# Patient Record
Sex: FEMALE | Race: BLACK OR AFRICAN AMERICAN | NOT HISPANIC OR LATINO | Employment: FULL TIME | URBAN - METROPOLITAN AREA
[De-identification: names, ages, dates, MRNs, and addresses within clinical notes are randomized per-mention and may not be internally consistent; named-entity substitution may affect disease eponyms.]

---

## 2020-05-07 RX ORDER — ESTERIFIED ESTROGEN AND METHYLTESTOSTERONE 1.25; 2.5 MG/1; MG/1
1 TABLET ORAL DAILY
COMMUNITY
End: 2020-05-19

## 2020-05-07 RX ORDER — LEVOTHYROXINE SODIUM 88 UG/1
88 TABLET ORAL
COMMUNITY
End: 2020-06-02 | Stop reason: SDUPTHER

## 2020-05-07 RX ORDER — IRON,CARBONYL/ASCORBIC ACID 100-250 MG
TABLET ORAL
COMMUNITY

## 2020-05-07 RX ORDER — LABETALOL 200 MG/1
200 TABLET, FILM COATED ORAL 2 TIMES DAILY
COMMUNITY
End: 2021-05-28

## 2020-05-07 RX ORDER — PROGESTERONE 200 MG/1
200 CAPSULE ORAL DAILY
COMMUNITY
End: 2021-05-28

## 2020-05-19 ENCOUNTER — OFFICE VISIT (OUTPATIENT)
Dept: OBSTETRICS AND GYNECOLOGY | Facility: CLINIC | Age: 50
End: 2020-05-19
Payer: COMMERCIAL

## 2020-05-19 ENCOUNTER — TELEPHONE (OUTPATIENT)
Dept: OBSTETRICS AND GYNECOLOGY | Facility: CLINIC | Age: 50
End: 2020-05-19

## 2020-05-19 VITALS
DIASTOLIC BLOOD PRESSURE: 80 MMHG | BODY MASS INDEX: 38.89 KG/M2 | WEIGHT: 242 LBS | SYSTOLIC BLOOD PRESSURE: 122 MMHG | HEIGHT: 66 IN

## 2020-05-19 DIAGNOSIS — Z01.419 GYNECOLOGIC EXAM NORMAL: Primary | ICD-10-CM

## 2020-05-19 DIAGNOSIS — Z12.31 VISIT FOR SCREENING MAMMOGRAM: ICD-10-CM

## 2020-05-19 PROCEDURE — 99396 PR PREVENTIVE VISIT,EST,40-64: ICD-10-PCS | Mod: S$GLB,,, | Performed by: NURSE PRACTITIONER

## 2020-05-19 PROCEDURE — 99396 PREV VISIT EST AGE 40-64: CPT | Mod: S$GLB,,, | Performed by: NURSE PRACTITIONER

## 2020-05-19 RX ORDER — ZOLPIDEM TARTRATE 10 MG/1
TABLET ORAL
COMMUNITY
Start: 2020-04-21

## 2020-05-19 RX ORDER — CLONIDINE HYDROCHLORIDE 0.1 MG/1
TABLET ORAL
COMMUNITY
Start: 2020-05-11 | End: 2021-05-28

## 2020-05-19 RX ORDER — ESTRADIOL 1 MG/G
1 GEL TOPICAL DAILY
Qty: 30 G | Refills: 11 | Status: SHIPPED | OUTPATIENT
Start: 2020-05-19 | End: 2021-01-14 | Stop reason: SDUPTHER

## 2020-05-19 RX ORDER — ASPIRIN 325 MG
50000 TABLET, DELAYED RELEASE (ENTERIC COATED) ORAL
COMMUNITY
Start: 2020-04-28 | End: 2021-05-28

## 2020-05-19 NOTE — PROGRESS NOTES
Subjective:       Patient ID: Trell Gee is a 49 y.o. female.    Chief Complaint:  Well Woman (would like to discuss change in HRT)      History of Present Illness  HPI  Annual Exam-Postmenopausal  Patient presents for annual exam. The patient has no complaints today. The patient is sexually active. GYN screening history: last pap: was abnormal: LSIL. The patient is taking hormone replacement therapy. Patient denies post-menopausal vaginal bleeding. The patient wears seatbelts: yes. The patient participates in regular exercise: yes. Has the patient ever been transfused or tattooed?: no. The patient reports that there is not domestic violence in her life.    Does not feel like HRT is working, no libido, no energy, hot flashes    GYN & OB History  No LMP recorded (exact date). Patient is postmenopausal.   Date of Last Pap: 2020    OB History    Para Term  AB Living   2 2           SAB TAB Ectopic Multiple Live Births                  # Outcome Date GA Lbr Rafiq/2nd Weight Sex Delivery Anes PTL Lv   2 Para            1 Para                Review of Systems  Review of Systems   Constitutional: Negative for activity change, appetite change, chills, fatigue and fever.   HENT: Negative for nasal congestion and tinnitus.    Eyes: Negative for visual disturbance.   Respiratory: Negative for cough and shortness of breath.    Cardiovascular: Negative for chest pain and palpitations.   Gastrointestinal: Negative for abdominal pain, bloating, blood in stool, constipation, nausea and vomiting.   Endocrine: Negative for diabetes, hair loss and hot flashes.   Genitourinary: Positive for decreased libido and hot flashes. Negative for bladder incontinence, dysmenorrhea, dyspareunia, dysuria, flank pain, frequency, genital sores, hematuria, menorrhagia, menstrual problem, pelvic pain, urgency, vaginal bleeding, vaginal discharge, vaginal pain, urinary incontinence, postcoital bleeding, postmenopausal  bleeding, vaginal dryness and vaginal odor.   Musculoskeletal: Negative for arthralgias, back pain, leg pain and myalgias.   Integumentary:  Negative for rash, acne, hair changes, mole/lesion, breast mass, nipple discharge, breast skin changes and breast tenderness.   Neurological: Negative for vertigo, syncope, numbness and headaches.   Hematological: Does not bruise/bleed easily.   Psychiatric/Behavioral: Negative for depression and sleep disturbance. The patient is not nervous/anxious.    Breast: Negative for asymmetry, lump, mass, mastodynia, nipple discharge, skin changes and tenderness          Objective:    Physical Exam:   Constitutional: Vital signs are normal. She appears well-developed and well-nourished.    HENT:   Head: Normocephalic.   Nose: No epistaxis.    Eyes: Lids are normal.    Neck: Trachea normal and full passive range of motion without pain.    Cardiovascular: Normal rate, regular rhythm and normal heart sounds.     Pulmonary/Chest: Effort normal and breath sounds normal. Right breast exhibits no mass, no skin change, no tenderness and no swelling. Left breast exhibits no mass, no skin change, no tenderness and no swelling. Breasts are symmetrical.        Abdominal: Normal appearance.     Genitourinary: Rectum normal, vagina normal and uterus normal. Pelvic exam was performed with patient supine. Cervix is normal. Right adnexum displays no mass and no tenderness. Left adnexum displays no mass and no tenderness. No erythema in the vagina. No vaginal discharge found. Labial bartholins normal.             Lymphadenopathy:     She has no cervical adenopathy.      Psychiatric: She has a normal mood and affect. Her speech is normal and behavior is normal. Judgment and thought content normal. Cognition and memory are normal.          Assessment:        1. Visit for screening mammogram     Annual well woman exam        Plan:      We discussed the pellets as an option for HRT, right now we will try the  test cream at [facility] and divigel, stop the covaryx. cologard ordered

## 2020-05-19 NOTE — PATIENT INSTRUCTIONS
Breast Health: Breast Self-Awareness  What is breast self-awareness?  Breast self-awareness is knowing how your breasts normally look and feel. Your breasts change as you go through different stages of your life. So its important to learn what is normal for your breasts. Breast self-awareness helps you notice any changes in your breasts right away. Report any changes to your healthcare provider.  Why is breast self-awareness important?  Many experts now say that women should focus on breast self-awareness instead of doing a breast self-examination (BSE). These experts include the American Cancer Society, the U.S. Preventive Services Task Force, and the American Congress of Obstetricians and Gynecologists. Some experts even advise not teaching women to do a BSE. Thats because research hasnt shown a clear benefit to doing BSEs.  Breast self-awareness is different than a BSE. Breast self-awareness isnt about following a certain method and schedule. Its about knowing what's normal for your breasts. That way you can notice even small changes right away. If you see any changes, report them to your healthcare provider.  Changes to look for  Call your healthcare provider if you find any changes in your breasts that concern you. These changes may include:  · A lump  · Nipple discharge other than breast milk, especially a bloody discharge  · Swelling  · A change in size or shape  · Skin irritation, such as redness, thickening, or dimpling of the skin  · Swollen lymph nodes in the armpit  · Nipple problems, such as pain or redness  If you find a lump  Contact your provider if you find lumpiness in one breast, feel something different in the tissue, or feel a definite lump. Sometimes lumpiness may be due to menstrual changes. But there may be reason for concern.  Your provider may want to see you right away if you have:  · Nipple discharge that is bloody  · Skin changes on your breast, such as dimpling or puckering  Its  normal to be upset if you find a lump. But its important to contact your provider right away. Remember that most breast lumps are benign. This means they are not cancer.  Date Last Reviewed: 8/10/2015  © 3962-6411 [employer]. [address] [place] [address]. All rights reserved. This information is not intended as a substitute for professional medical care. Always follow your healthcare professional's instructions.        The Range of Pap Test Results  When your Pap test is sent to the lab, the lab studies your cell samples and reports any abnormal cell changes. Your health care provider can discuss these changes with you. In some cases, an abnormal Pap test is due to an infection. More serious cell changes range from dysplasia to cancer. Talk to your health care provider about your Pap test.    Normal results  Cervical cells, even normal ones, are always changing. As they mature, normal squamous cells move from deeper layers within the cervix. Over time, these cells flatten and cover the surface of the cervix. Within the cervical canal, the cells are different. These glandular cells are taller and not as flat as the cells on the surface of the cervix. When a Pap test sample shows healthy cells of both types, the results are negative. Keep having Pap tests as often as directed.  Abnormal results  A positive Pap test result means some cells in the sample showed abnormal changes. These results are grouped by the type of cell change and the location, or extent, of the changes. Depending on the results, you may need further testing.  · Inflammation: Noncancerous changes are present. They may be due to normal cell repair. Or, they may be caused by an infection, such as HPV or yeast. Further testing may be needed. (Also called reactive cellular changes.)  · Atypical squamous cells: Test results are unclear. Cells on the surface of the cervix show changes, but their significance is not yet known.  Testing for HPV and other sexually transmitted infections (STIs) may be needed. Treatment may be required. (Reported as ASC-US or ASC-H.)  · Atypical glandular cells: Cells lining the cervical canal show abnormal changes. Further testing is likely. You may also have treatment to destroy or remove problem cells. (Reported as AGC.)  · Mild dysplasia: Cells show distinct changes. More testing or HPV typing may be done. You may also have treatment to destroy or remove problem cells. (Reported as low-grade ELY or BALJINDER 1.)  · Moderate to severe dysplasia: Cells show precancerous changes. Or, noninvasive cancer (carcinoma in situ) may be present. Treatment to destroy or remove problem cells is likely. (Reported as high-grade ELY or BALJINDER 2 or BALJINDER 3.)  · Cancer: Different types of cancer may be detected by your Pap test. More tests to assess the cancer's extent are likely. The type of treatment will depend on the test results and other factors, such as age and health history. (Reported as squamous cell carcinoma, endocervical adenocarcinoma in situ, or adenocarcinoma.)  Date Last Reviewed: 5/12/2015 © 2000-2017 [employer]. [address] [place] [address]. All rights reserved. This information is not intended as a substitute for professional medical care. Always follow your healthcare professional's instructions.      We lawanda lstart on divigel and testosterone cream. [facility] will call you when it is ready to

## 2020-05-29 ENCOUNTER — PATIENT MESSAGE (OUTPATIENT)
Dept: OBSTETRICS AND GYNECOLOGY | Facility: CLINIC | Age: 50
End: 2020-05-29

## 2020-06-02 ENCOUNTER — PATIENT MESSAGE (OUTPATIENT)
Dept: OBSTETRICS AND GYNECOLOGY | Facility: CLINIC | Age: 50
End: 2020-06-02

## 2020-06-02 RX ORDER — LEVOTHYROXINE SODIUM 88 UG/1
88 TABLET ORAL
Qty: 30 TABLET | Refills: 3 | Status: SHIPPED | OUTPATIENT
Start: 2020-06-02

## 2020-06-02 NOTE — TELEPHONE ENCOUNTER
Called patient in regards to refill of Levothyroxine. She states she currently does not have a PCP and had labs drawn through [facility] last month. Do you want to refill until she finds someone?

## 2020-07-28 ENCOUNTER — PATIENT MESSAGE (OUTPATIENT)
Dept: OBSTETRICS AND GYNECOLOGY | Facility: CLINIC | Age: 50
End: 2020-07-28

## 2020-07-29 ENCOUNTER — PATIENT MESSAGE (OUTPATIENT)
Dept: OBSTETRICS AND GYNECOLOGY | Facility: CLINIC | Age: 50
End: 2020-07-29

## 2021-01-14 RX ORDER — LEVOTHYROXINE SODIUM 88 UG/1
88 TABLET ORAL
Qty: 30 TABLET | Refills: 3 | OUTPATIENT
Start: 2021-01-14

## 2021-05-12 ENCOUNTER — PATIENT MESSAGE (OUTPATIENT)
Dept: RESEARCH | Facility: HOSPITAL | Age: 51
End: 2021-05-12

## 2021-05-28 ENCOUNTER — OFFICE VISIT (OUTPATIENT)
Dept: OBSTETRICS AND GYNECOLOGY | Facility: CLINIC | Age: 51
End: 2021-05-28
Payer: MEDICAID

## 2021-05-28 VITALS
WEIGHT: 256 LBS | BODY MASS INDEX: 41.14 KG/M2 | DIASTOLIC BLOOD PRESSURE: 78 MMHG | SYSTOLIC BLOOD PRESSURE: 116 MMHG | HEART RATE: 69 BPM | HEIGHT: 66 IN

## 2021-05-28 DIAGNOSIS — Z01.419 GYNECOLOGIC EXAM NORMAL: Primary | ICD-10-CM

## 2021-05-28 DIAGNOSIS — Z12.31 ENCOUNTER FOR SCREENING MAMMOGRAM FOR MALIGNANT NEOPLASM OF BREAST: ICD-10-CM

## 2021-05-28 PROCEDURE — 99396 PREV VISIT EST AGE 40-64: CPT | Mod: S$GLB,,, | Performed by: NURSE PRACTITIONER

## 2021-05-28 PROCEDURE — 99396 PR PREVENTIVE VISIT,EST,40-64: ICD-10-PCS | Mod: S$GLB,,, | Performed by: NURSE PRACTITIONER

## 2021-05-28 RX ORDER — ALPRAZOLAM 0.5 MG/1
TABLET ORAL
COMMUNITY
Start: 2021-05-11

## 2021-05-28 RX ORDER — CLONIDINE HYDROCHLORIDE 0.2 MG/1
0.2 TABLET ORAL 3 TIMES DAILY
COMMUNITY
Start: 2021-05-17

## 2021-05-28 RX ORDER — LABETALOL 300 MG/1
300 TABLET, FILM COATED ORAL 2 TIMES DAILY
COMMUNITY
Start: 2021-04-28

## 2021-05-28 RX ORDER — OLMESARTAN MEDOXOMIL AND HYDROCHLOROTHIAZIDE 40/25 40; 25 MG/1; MG/1
1 TABLET ORAL EVERY MORNING
COMMUNITY
Start: 2021-05-13

## 2021-08-02 ENCOUNTER — PATIENT MESSAGE (OUTPATIENT)
Dept: OBSTETRICS AND GYNECOLOGY | Facility: CLINIC | Age: 51
End: 2021-08-02

## 2022-06-10 DIAGNOSIS — Z01.419 GYNECOLOGIC EXAM NORMAL: ICD-10-CM

## 2022-06-10 RX ORDER — ESTRADIOL 1 MG/G
1 GEL TOPICAL DAILY
Qty: 30 G | Refills: 11 | OUTPATIENT
Start: 2022-06-10 | End: 2023-06-10

## 2022-06-14 ENCOUNTER — TELEPHONE (OUTPATIENT)
Dept: OBSTETRICS AND GYNECOLOGY | Facility: CLINIC | Age: 52
End: 2022-06-14
Payer: MEDICAID

## 2022-06-15 ENCOUNTER — OFFICE VISIT (OUTPATIENT)
Dept: OBSTETRICS AND GYNECOLOGY | Facility: CLINIC | Age: 52
End: 2022-06-15
Payer: MEDICAID

## 2022-06-15 VITALS
HEART RATE: 59 BPM | SYSTOLIC BLOOD PRESSURE: 126 MMHG | BODY MASS INDEX: 46.61 KG/M2 | WEIGHT: 290 LBS | HEIGHT: 66 IN | DIASTOLIC BLOOD PRESSURE: 66 MMHG

## 2022-06-15 DIAGNOSIS — Z13.820 SCREENING FOR OSTEOPOROSIS: ICD-10-CM

## 2022-06-15 DIAGNOSIS — N95.1 MENOPAUSE SYNDROME: ICD-10-CM

## 2022-06-15 DIAGNOSIS — Z01.419 GYNECOLOGIC EXAM NORMAL: Primary | ICD-10-CM

## 2022-06-15 DIAGNOSIS — Z12.31 ENCOUNTER FOR SCREENING MAMMOGRAM FOR MALIGNANT NEOPLASM OF BREAST: ICD-10-CM

## 2022-06-15 PROCEDURE — 1160F RVW MEDS BY RX/DR IN RCRD: CPT | Mod: CPTII,S$GLB,, | Performed by: NURSE PRACTITIONER

## 2022-06-15 PROCEDURE — 3008F BODY MASS INDEX DOCD: CPT | Mod: CPTII,S$GLB,, | Performed by: NURSE PRACTITIONER

## 2022-06-15 PROCEDURE — 3074F SYST BP LT 130 MM HG: CPT | Mod: CPTII,S$GLB,, | Performed by: NURSE PRACTITIONER

## 2022-06-15 PROCEDURE — 3078F PR MOST RECENT DIASTOLIC BLOOD PRESSURE < 80 MM HG: ICD-10-PCS | Mod: CPTII,S$GLB,, | Performed by: NURSE PRACTITIONER

## 2022-06-15 PROCEDURE — 1160F PR REVIEW ALL MEDS BY PRESCRIBER/CLIN PHARMACIST DOCUMENTED: ICD-10-PCS | Mod: CPTII,S$GLB,, | Performed by: NURSE PRACTITIONER

## 2022-06-15 PROCEDURE — 3078F DIAST BP <80 MM HG: CPT | Mod: CPTII,S$GLB,, | Performed by: NURSE PRACTITIONER

## 2022-06-15 PROCEDURE — 1159F MED LIST DOCD IN RCRD: CPT | Mod: CPTII,S$GLB,, | Performed by: NURSE PRACTITIONER

## 2022-06-15 PROCEDURE — 3074F PR MOST RECENT SYSTOLIC BLOOD PRESSURE < 130 MM HG: ICD-10-PCS | Mod: CPTII,S$GLB,, | Performed by: NURSE PRACTITIONER

## 2022-06-15 PROCEDURE — 99396 PREV VISIT EST AGE 40-64: CPT | Mod: S$GLB,,, | Performed by: NURSE PRACTITIONER

## 2022-06-15 PROCEDURE — 99396 PR PREVENTIVE VISIT,EST,40-64: ICD-10-PCS | Mod: S$GLB,,, | Performed by: NURSE PRACTITIONER

## 2022-06-15 PROCEDURE — 4010F PR ACE/ARB THEARPY RXD/TAKEN: ICD-10-PCS | Mod: CPTII,S$GLB,, | Performed by: NURSE PRACTITIONER

## 2022-06-15 PROCEDURE — 3008F PR BODY MASS INDEX (BMI) DOCUMENTED: ICD-10-PCS | Mod: CPTII,S$GLB,, | Performed by: NURSE PRACTITIONER

## 2022-06-15 PROCEDURE — 4010F ACE/ARB THERAPY RXD/TAKEN: CPT | Mod: CPTII,S$GLB,, | Performed by: NURSE PRACTITIONER

## 2022-06-15 PROCEDURE — 1159F PR MEDICATION LIST DOCUMENTED IN MEDICAL RECORD: ICD-10-PCS | Mod: CPTII,S$GLB,, | Performed by: NURSE PRACTITIONER

## 2022-06-15 RX ORDER — LISDEXAMFETAMINE DIMESYLATE 30 MG/1
30 CAPSULE ORAL EVERY MORNING
COMMUNITY
Start: 2022-05-18

## 2022-06-15 RX ORDER — PROGESTERONE 200 MG/1
200 CAPSULE ORAL NIGHTLY
Qty: 30 CAPSULE | Refills: 11 | Status: SHIPPED | OUTPATIENT
Start: 2022-06-15 | End: 2023-06-15

## 2022-06-15 RX ORDER — OLMESARTAN MEDOXOMIL 40 MG/1
40 TABLET ORAL EVERY MORNING
COMMUNITY
Start: 2022-05-15

## 2022-06-15 RX ORDER — ESTRADIOL 1 MG/G
1 GEL TOPICAL DAILY
Qty: 30 G | Refills: 11 | Status: SHIPPED | OUTPATIENT
Start: 2022-06-15 | End: 2023-06-15

## 2022-06-15 NOTE — PROGRESS NOTES
"  Subjective:       Patient ID: Trell Elkins is a 51 y.o. female.    Chief Complaint:  Well Woman      History of Present Illness  HPI  Annual Exam-Postmenopausal  Patient presents for annual exam. The patient has no complaints today. The patient is sexually active. GYN screening history: last pap: was normal and last mammogram: was normal. The patient is taking hormone replacement therapy. Patient denies post-menopausal vaginal bleeding. The patient wears seatbelts: yes. The patient participates in regular exercise: no. Has the patient ever been transfused or tattooed?: not asked. The patient reports that there is not domestic violence in her life.     Reports that she has been having some weight gain     Outpatient Medications Marked as Taking for the 6/15/22 encounter (Office Visit) with Bella Dahl NP   Medication Sig Dispense Refill    ALPRAZolam (XANAX) 0.5 MG tablet       cloNIDine (CATAPRES) 0.2 MG tablet Take 0.2 mg by mouth 3 (three) times daily.      estradioL (DIVIGEL) 1 mg/gram (0.1 %) topical gel Place 1 g onto the skin once daily. 30 g 11    iron-vitamin C 100-250 mg, ICAR-C, 100-250 mg Tab Take by mouth.      labetaloL (NORMODYNE) 300 MG tablet Take 300 mg by mouth 2 (two) times daily.      levothyroxine (SYNTHROID) 88 MCG tablet Take 1 tablet (88 mcg total) by mouth before breakfast. 30 tablet 3    olmesartan (BENICAR) 40 MG tablet Take 40 mg by mouth every morning.      olmesartan-hydrochlorothiazide (BENICAR HCT) 40-25 mg per tablet Take 1 tablet by mouth every morning.      VYVANSE 30 mg capsule Take 30 mg by mouth every morning.      zolpidem (AMBIEN) 10 mg Tab       [DISCONTINUED] estradioL (DIVIGEL) 1 mg/gram (0.1 %) topical gel Place 1 g onto the skin once daily. 30 g 11     Vitals:    06/15/22 1604   BP: 126/66   Pulse: (!) 59   Weight: 131.5 kg (290 lb)   Height: 5' 6" (1.676 m)     Past Medical History:   Diagnosis Date    Hypertension     Hyperthyroidism     " LGSIL on Pap smear of cervix     Menopausal syndrome     Mitral valve prolapse      Past Surgical History:   Procedure Laterality Date     SECTION      x2    CHOLECYSTECTOMY      GASTRIC BYPASS           GYN & OB History  No LMP recorded. Patient is postmenopausal.   Date of Last Pap: No result found    OB History    Para Term  AB Living   2 2           SAB IAB Ectopic Multiple Live Births                  # Outcome Date GA Lbr Rafiq/2nd Weight Sex Delivery Anes PTL Lv   2 Para            1 Para                Review of Systems  Review of Systems   Constitutional: Positive for unexpected weight change (+). Negative for activity change, appetite change, chills, fatigue and fever.   HENT: Negative for nasal congestion and tinnitus.    Eyes: Negative for visual disturbance.   Respiratory: Negative for cough and shortness of breath.    Cardiovascular: Negative for chest pain and palpitations.   Gastrointestinal: Negative for abdominal pain, bloating, blood in stool, constipation, nausea and vomiting.   Endocrine: Negative for diabetes, hair loss and hot flashes.   Genitourinary: Negative for bladder incontinence, decreased libido, dysmenorrhea, dyspareunia, dysuria, flank pain, frequency, genital sores, hematuria, hot flashes, menorrhagia, menstrual problem, pelvic pain, urgency, vaginal bleeding, vaginal discharge, vaginal pain, urinary incontinence, postcoital bleeding, postmenopausal bleeding, vaginal dryness and vaginal odor.   Musculoskeletal: Negative for arthralgias, back pain, leg pain and myalgias.   Integumentary:  Negative for rash, acne, hair changes, mole/lesion, breast mass, nipple discharge, breast skin changes and breast tenderness.   Neurological: Negative for vertigo, syncope, numbness and headaches.   Hematological: Does not bruise/bleed easily.   Psychiatric/Behavioral: Negative for depression and sleep disturbance. The patient is not nervous/anxious.    Breast: Negative  for asymmetry, lump, mass, mastodynia, nipple discharge, skin changes and tenderness          Objective:    Physical Exam:   Constitutional: Vital signs are normal. She appears well-developed and well-nourished.    HENT:   Head: Normocephalic.   Nose: No epistaxis.    Eyes: Lids are normal.    Neck: Trachea normal.    Cardiovascular: Normal rate, regular rhythm and normal heart sounds.     Pulmonary/Chest: Effort normal and breath sounds normal. Right breast exhibits no mass, no skin change, no tenderness and no swelling. Left breast exhibits no mass, no skin change, no tenderness and no swelling. Breasts are symmetrical.          Genitourinary:    Vagina, uterus and rectum normal.      Pelvic exam was performed with patient supine.   Labial bartholins normal.Cervix is normal. Right adnexum displays no mass and no tenderness. Left adnexum displays no mass and no tenderness. No erythema or  no vaginal discharge in the vagina.              Lymphadenopathy:     She has no cervical adenopathy.      Psychiatric: She has a normal mood and affect. Her speech is normal and behavior is normal. Judgment and thought content normal.          Assessment:        1. Gynecologic exam normal    2. Menopause syndrome    3. Encounter for screening mammogram for malignant neoplasm of breast    4. Screening for osteoporosis                Plan:      Gynecologic exam normal  -     estradioL (DIVIGEL) 1 mg/gram (0.1 %) topical gel; Place 1 g onto the skin once daily.  Dispense: 30 g; Refill: 11    Menopause syndrome  -     progesterone (PROMETRIUM) 200 MG capsule; Take 1 capsule (200 mg total) by mouth nightly.  Dispense: 30 capsule; Refill: 11    Encounter for screening mammogram for malignant neoplasm of breast  -     Mammo Digital Screening Bilat w/ Brice; Future; Expected date: 06/15/2022    Screening for osteoporosis  -     DXA Bone Density Spine And Hip; Future; Expected date: 06/15/2022      We will do an US for the unopposed  estrogen therapy, sent out progesterone    Patient was counseled today on current ASCCP pap guidelines, the recommendation for yearly pelvic exams, healthy diet and exercise routines, annual mammograms, and breast self awareness. She is to see her PCP for other health maintenance. loy due this year     Follow up in about 1 year (around 6/15/2023).

## 2022-06-24 ENCOUNTER — PROCEDURE VISIT (OUTPATIENT)
Dept: OBSTETRICS AND GYNECOLOGY | Facility: CLINIC | Age: 52
End: 2022-06-24
Payer: MEDICAID

## 2022-06-24 DIAGNOSIS — R10.2 PELVIC PAIN: Primary | ICD-10-CM

## 2022-06-24 DIAGNOSIS — R10.2 PELVIC PAIN: ICD-10-CM

## 2022-06-24 PROCEDURE — 76830 US OB/GYN PROCEDURE (VIEWPOINT): ICD-10-PCS | Mod: S$GLB,,, | Performed by: OBSTETRICS & GYNECOLOGY

## 2022-06-24 PROCEDURE — 76830 TRANSVAGINAL US NON-OB: CPT | Mod: S$GLB,,, | Performed by: OBSTETRICS & GYNECOLOGY
